# Patient Record
(demographics unavailable — no encounter records)

---

## 2025-05-04 NOTE — HEALTH RISK ASSESSMENT
[No] : No [No falls in past year] : Patient reported no falls in the past year [0] : 2) Feeling down, depressed, or hopeless: Not at all (0) [PHQ-2 Negative - No further assessment needed] : PHQ-2 Negative - No further assessment needed [Never] : Never [[id number]] : 0

## 2025-05-04 NOTE — HEALTH RISK ASSESSMENT
[No] : No [No falls in past year] : Patient reported no falls in the past year [0] : 2) Feeling down, depressed, or hopeless: Not at all (0) [PHQ-2 Negative - No further assessment needed] : PHQ-2 Negative - No further assessment needed [Never] : Never [[id number]] : 0 Tetracycline Pregnancy And Lactation Text: This medication is Pregnancy Category D and not consider safe during pregnancy. It is also excreted in breast milk.

## 2025-05-04 NOTE — HISTORY OF PRESENT ILLNESS
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  Daughter noted eval with Neuro for memory loss and Neuro believes it may be depression. She was on Sertraline in the past with good results and would like to restart. Patient spoke about very sick brother in ICU and will be returning [place] to visit him.

## 2025-05-04 NOTE — HISTORY OF PRESENT ILLNESS
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  Daughter noted eval with Neuro for memory loss and Neuro believes it may be depression. She was on Sertraline in the past with good results and would like to restart. Patient spoke about very sick brother in ICU and will be returning [place] to visit him.

## 2025-05-04 NOTE — PHYSICAL EXAM
[No Edema] : there was no peripheral edema [No Palpable Aorta] : no palpable aorta [No Extremity Clubbing/Cyanosis] : no extremity clubbing/cyanosis [Soft] : abdomen soft [Non Tender] : non-tender [Non-distended] : non-distended [No Masses] : no abdominal mass palpated [No HSM] : no HSM [Normal] : no joint swelling and grossly normal strength and tone [Coordination Grossly Intact] : coordination grossly intact [No Focal Deficits] : no focal deficits [Normal Gait] : normal gait [de-identified] :  ankle vein

## 2025-05-04 NOTE — PLAN
[FreeTextEntry1] : Pulm nodules- refuse further testing Depression- start Sertraline 50mg daily HTN- continue Amlodipine 5mg daily, Losartan HLD-continue Atorvastatin 20mg daily/HCTZ 1100-25mg DM- continue Metformin ER (osm), Januvia 100mg daily vaccination UTD

## 2025-05-04 NOTE — PHYSICAL EXAM
[No Edema] : there was no peripheral edema [No Palpable Aorta] : no palpable aorta [No Extremity Clubbing/Cyanosis] : no extremity clubbing/cyanosis [Soft] : abdomen soft [Non Tender] : non-tender [Non-distended] : non-distended [No Masses] : no abdominal mass palpated [No HSM] : no HSM [Normal] : no joint swelling and grossly normal strength and tone [Coordination Grossly Intact] : coordination grossly intact [No Focal Deficits] : no focal deficits [Normal Gait] : normal gait [de-identified] :  ankle vein